# Patient Record
Sex: MALE | Race: OTHER | Employment: OTHER | URBAN - METROPOLITAN AREA
[De-identification: names, ages, dates, MRNs, and addresses within clinical notes are randomized per-mention and may not be internally consistent; named-entity substitution may affect disease eponyms.]

---

## 2022-07-31 ENCOUNTER — HOSPITAL ENCOUNTER (EMERGENCY)
Age: 57
Discharge: HOME | End: 2022-07-31

## 2022-07-31 VITALS
OXYGEN SATURATION: 99 % | DIASTOLIC BLOOD PRESSURE: 67 MMHG | SYSTOLIC BLOOD PRESSURE: 121 MMHG | RESPIRATION RATE: 16 BRPM | HEART RATE: 64 BPM | TEMPERATURE: 98 F

## 2022-07-31 DIAGNOSIS — N39.0 ACUTE UTI (URINARY TRACT INFECTION): Primary | ICD-10-CM

## 2022-07-31 LAB
BACTERIA: ABNORMAL /HPF
BILIRUBIN URINE: NEGATIVE
BLOOD, URINE: NEGATIVE
CLARITY: ABNORMAL
COLOR: YELLOW
COMMENT UA: ABNORMAL
EPITHELIAL CELLS, UA: 0 /HPF (ref 0–5)
GLUCOSE URINE: NEGATIVE MG/DL
HYALINE CASTS: 0 /LPF (ref 0–8)
KETONES, URINE: NEGATIVE MG/DL
LEUKOCYTE ESTERASE, URINE: ABNORMAL
MICROSCOPIC EXAMINATION: YES
MUCUS: ABNORMAL /LPF
NITRITE, URINE: NEGATIVE
PH UA: 6 (ref 5–8)
PROTEIN UA: NEGATIVE MG/DL
RBC UA: ABNORMAL /HPF (ref 0–4)
SPECIFIC GRAVITY UA: 1.02 (ref 1–1.03)
URINE REFLEX TO CULTURE: YES
URINE TYPE: ABNORMAL
UROBILINOGEN, URINE: 0.2 E.U./DL
WBC UA: 47 /HPF (ref 0–5)

## 2022-07-31 PROCEDURE — 81001 URINALYSIS AUTO W/SCOPE: CPT

## 2022-07-31 PROCEDURE — 99283 EMERGENCY DEPT VISIT LOW MDM: CPT

## 2022-07-31 PROCEDURE — 87086 URINE CULTURE/COLONY COUNT: CPT

## 2022-07-31 RX ORDER — CIPROFLOXACIN 500 MG/1
500 TABLET, FILM COATED ORAL 2 TIMES DAILY
Qty: 28 TABLET | Refills: 0 | Status: SHIPPED | OUTPATIENT
Start: 2022-07-31 | End: 2022-08-14

## 2022-07-31 ASSESSMENT — ENCOUNTER SYMPTOMS
CONSTIPATION: 0
SORE THROAT: 0
NAUSEA: 0
VOMITING: 0
SHORTNESS OF BREATH: 0
BACK PAIN: 0
DIARRHEA: 0
ABDOMINAL PAIN: 0

## 2022-08-01 NOTE — ED PROVIDER NOTES
[address] [facility]      Pt Name: Rainer Villalba  MRN: [id number]  [place] 1965  Date of evaluation: 7/31/2022  Provider: Adriana Phan PA-C    This patient was not seen and evaluated by the attending physician No att. providers found. CHIEF COMPLAINT      Chief Complaint:dysuria      HISTORYOF PRESENT ILLNESS  (Location/Symptom, Timing/Onset, Context/Setting, Quality, Duration, Modifying Factors, Severity.)   Rainer Villalba is a 56 y.o. male who presents to the emergency department with dysuria and hematuria. His symptoms started a couple of days ago. It is intermittent. He describes burning with urination and a couple of drops of blood in the toilet. Nothing makes it better or worse. No associated abdominal pain, fevers, chills, flank pain, vomiting. He reports that he was seen on July 5 at urgent care with similar symptoms and at that time treated with Bactrim for 7 days. He says his symptoms resolved but are back again. He does not believe the urgent care sent a urine culture. He is visiting from [facility] and living here until October when he will return to [facility]. He does not have a physician here. He is Irish-speaking and  was used to obtain the history. Nursing Notes were reviewed and I agree. REVIEW OF SYSTEMS    (2-9 systems for level 4, 10 or more forlevel 5)     Review of Systems   Constitutional:  Negative for chills and fever. HENT:  Negative for sore throat. Respiratory:  Negative for shortness of breath. Cardiovascular:  Negative for chest pain. Gastrointestinal:  Negative for abdominal pain, constipation, diarrhea, nausea and vomiting. Genitourinary:  Positive for dysuria, frequency and hematuria. Negative for difficulty urinating, scrotal swelling and testicular pain. Musculoskeletal:  Negative for back pain. Skin:  Negative for rash.    Neurological:  Negative for MODERATE (*)     All other components within normal limits   MICROSCOPIC URINALYSIS - Abnormal; Notable for the following components:    Mucus, UA Rare (*)     RBC, UA 5-10 (*)     WBC, UA 47 (*)     All other components within normal limits   CULTURE, URINE       When ordered, only abnormal lab results are displayed. All other labs are within normal range or not returned as of the dictation. EMERGENCY DEPARTMENT COURSE and DIFFERENTIAL DIAGNOSIS/MDM:   Vitals:    Vitals:    07/31/22 2012   BP: 138/68   Pulse: 78   Resp: 14   Temp: 98 °F (36.7 °C)   TempSrc: Oral   SpO2: 98%        I have evaluated this patient. My supervising physician was available for consultation. The patient is nontoxic and afebrile. His abdomen is benign. No flank pain. No vomiting. I had a long talk with this patient using the . I it appears that he failed outpatient management of UTI with Bactrim earlier this month. Because of this, I am worried that he could have prostatitis or potentially a urine culture was not sent and they put him on an appropriate antibiotic. Regardless, I will cover for possible prostatitis at this time with Cipro twice daily for 14 days and also provide urgent referral to urologist.  He does not have a PCP here. I think he can probably get a follow-up appoint with urology sooner than trying to establish with a new PCP and he is only here until October. Patient was understanding of plan. He was discharged in stable condition. Is this patient to be included in the SEP-1 Core Measure due to severe sepsis or septic shock? No   Exclusion criteria - the patient is NOT to be included for SEP-1 Core Measure due to:  2+ SIRS criteria are not met      Discussed results, diagnosis and plan with patient and/or family. Questions addressed. Dispositionand follow-up agreed upon. Specific discharge instructions explained.  The patient and/or family and I have discussed the diagnosis and risks, and we agree with discharging home to follow-up with their primary care,specialist or referral doctor. We also discussed returning to the Emergency Department immediately if new or worsening symptoms occur. We have discussed the symptoms which are most concerning that necessitate immediatereturn. PROCEDURES:  None    FINAL IMPRESSION      1. Acute UTI (urinary tract infection)          DISPOSITION/PLAN   DISPOSITION Decision To Discharge 07/31/2022 09:52:03 PM      PATIENT REFERRED TO:  Benigno Ye, [address]  [phone]    Call in 1 day  make a follow up appointment as soon as possible      MEDICATIONS:  New Prescriptions    CIPROFLOXACIN (CIPRO) 500 MG TABLET    Take 1 tablet by mouth in the morning and 1 tablet before bedtime. Do all this for 14 days.        (Please note that portions of this note were completed with a voice recognition program.  Efforts were made toedit the dictations but occasionally words are mis-transcribed.)    IRMA Wood PA-C  07/31/22 [id number]

## 2022-08-02 LAB — URINE CULTURE, ROUTINE: NORMAL
